# Patient Record
Sex: MALE | Race: WHITE | NOT HISPANIC OR LATINO | Employment: STUDENT | URBAN - METROPOLITAN AREA
[De-identification: names, ages, dates, MRNs, and addresses within clinical notes are randomized per-mention and may not be internally consistent; named-entity substitution may affect disease eponyms.]

---
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
## 2021-11-16 ENCOUNTER — TELEPHONE (OUTPATIENT)
Dept: CARDIOLOGY CLINIC | Facility: CLINIC | Age: 21
End: 2021-11-16

## 2022-10-10 ENCOUNTER — HOSPITAL ENCOUNTER (EMERGENCY)
Facility: HOSPITAL | Age: 22
Discharge: HOME | End: 2022-10-10
Attending: EMERGENCY MEDICINE | Admitting: EMERGENCY MEDICINE
Payer: COMMERCIAL

## 2022-10-10 ENCOUNTER — APPOINTMENT (EMERGENCY)
Dept: CT IMAGING | Facility: HOSPITAL | Age: 22
End: 2022-10-10
Payer: COMMERCIAL

## 2022-10-10 ENCOUNTER — APPOINTMENT (OUTPATIENT)
Dept: RADIOLOGY | Facility: HOSPITAL | Age: 22
End: 2022-10-10
Payer: COMMERCIAL

## 2022-10-10 VITALS
SYSTOLIC BLOOD PRESSURE: 106 MMHG | OXYGEN SATURATION: 96 % | TEMPERATURE: 99 F | HEIGHT: 69 IN | BODY MASS INDEX: 20.73 KG/M2 | RESPIRATION RATE: 16 BRPM | HEART RATE: 87 BPM | DIASTOLIC BLOOD PRESSURE: 53 MMHG | WEIGHT: 140 LBS

## 2022-10-10 DIAGNOSIS — R11.2 NAUSEA AND VOMITING: ICD-10-CM

## 2022-10-10 DIAGNOSIS — R10.9 ABDOMINAL PAIN: ICD-10-CM

## 2022-10-10 DIAGNOSIS — B34.9 ACUTE VIRAL SYNDROME: Primary | ICD-10-CM

## 2022-10-10 LAB
ALBUMIN SERPL BCP-MCNC: 3.9 G/DL (ref 3.5–5)
ALP SERPL-CCNC: 77 U/L (ref 46–116)
ALT SERPL W P-5'-P-CCNC: 10 U/L (ref 12–78)
ANION GAP SERPL CALCULATED.3IONS-SCNC: 10 MMOL/L (ref 4–13)
APTT PPP: 30 SECONDS (ref 23–37)
AST SERPL W P-5'-P-CCNC: 13 U/L (ref 5–45)
ATRIAL RATE: 90 BPM
BASOPHILS # BLD AUTO: 0.03 THOUSANDS/ΜL (ref 0–0.1)
BASOPHILS NFR BLD AUTO: 0 % (ref 0–1)
BILIRUB SERPL-MCNC: 0.5 MG/DL (ref 0.2–1)
BUN SERPL-MCNC: 11 MG/DL (ref 5–25)
CALCIUM SERPL-MCNC: 9 MG/DL (ref 8.3–10.1)
CARDIAC TROPONIN I PNL SERPL HS: 6 NG/L
CHLORIDE SERPL-SCNC: 103 MMOL/L (ref 96–108)
CK SERPL-CCNC: 71 U/L (ref 39–308)
CO2 SERPL-SCNC: 26 MMOL/L (ref 21–32)
CREAT SERPL-MCNC: 1.25 MG/DL (ref 0.6–1.3)
CRP SERPL QL: 93.1 MG/L
EOSINOPHIL # BLD AUTO: 0.09 THOUSAND/ΜL (ref 0–0.61)
EOSINOPHIL NFR BLD AUTO: 1 % (ref 0–6)
ERYTHROCYTE [DISTWIDTH] IN BLOOD BY AUTOMATED COUNT: 15 % (ref 11.6–15.1)
ERYTHROCYTE [SEDIMENTATION RATE] IN BLOOD: 48 MM/HOUR (ref 0–14)
FLUAV RNA RESP QL NAA+PROBE: NEGATIVE
FLUBV RNA RESP QL NAA+PROBE: NEGATIVE
GFR SERPL CREATININE-BSD FRML MDRD: 81 ML/MIN/1.73SQ M
GLUCOSE SERPL-MCNC: 108 MG/DL (ref 65–140)
HCT VFR BLD AUTO: 43.3 % (ref 36.5–49.3)
HGB BLD-MCNC: 13.5 G/DL (ref 12–17)
IMM GRANULOCYTES # BLD AUTO: 0.03 THOUSAND/UL (ref 0–0.2)
IMM GRANULOCYTES NFR BLD AUTO: 0 % (ref 0–2)
INR PPP: 1.04 (ref 0.84–1.19)
LACTATE SERPL-SCNC: 0.9 MMOL/L (ref 0.5–2)
LIPASE SERPL-CCNC: 94 U/L (ref 73–393)
LYMPHOCYTES # BLD AUTO: 1 THOUSANDS/ΜL (ref 0.6–4.47)
LYMPHOCYTES NFR BLD AUTO: 11 % (ref 14–44)
MCH RBC QN AUTO: 23 PG (ref 26.8–34.3)
MCHC RBC AUTO-ENTMCNC: 31.2 G/DL (ref 31.4–37.4)
MCV RBC AUTO: 74 FL (ref 82–98)
MONOCYTES # BLD AUTO: 1.09 THOUSAND/ΜL (ref 0.17–1.22)
MONOCYTES NFR BLD AUTO: 12 % (ref 4–12)
NEUTROPHILS # BLD AUTO: 6.98 THOUSANDS/ΜL (ref 1.85–7.62)
NEUTS SEG NFR BLD AUTO: 76 % (ref 43–75)
NRBC BLD AUTO-RTO: 0 /100 WBCS
P AXIS: 65 DEGREES
PLATELET # BLD AUTO: 238 THOUSANDS/UL (ref 149–390)
PMV BLD AUTO: 10.6 FL (ref 8.9–12.7)
POTASSIUM SERPL-SCNC: 3.7 MMOL/L (ref 3.5–5.3)
PR INTERVAL: 126 MS
PROCALCITONIN SERPL-MCNC: 0.14 NG/ML
PROT SERPL-MCNC: 8.1 G/DL (ref 6.4–8.4)
PROTHROMBIN TIME: 14.4 SECONDS (ref 11.6–14.5)
QRS AXIS: 82 DEGREES
QRSD INTERVAL: 82 MS
QT INTERVAL: 328 MS
QTC INTERVAL: 401 MS
RBC # BLD AUTO: 5.87 MILLION/UL (ref 3.88–5.62)
RSV RNA RESP QL NAA+PROBE: NEGATIVE
SARS-COV-2 RNA RESP QL NAA+PROBE: NEGATIVE
SODIUM SERPL-SCNC: 139 MMOL/L (ref 135–147)
T WAVE AXIS: 45 DEGREES
VENTRICULAR RATE: 90 BPM
WBC # BLD AUTO: 9.22 THOUSAND/UL (ref 4.31–10.16)

## 2022-10-10 PROCEDURE — 83605 ASSAY OF LACTIC ACID: CPT | Performed by: EMERGENCY MEDICINE

## 2022-10-10 PROCEDURE — 84145 PROCALCITONIN (PCT): CPT | Performed by: EMERGENCY MEDICINE

## 2022-10-10 PROCEDURE — 93005 ELECTROCARDIOGRAM TRACING: CPT

## 2022-10-10 PROCEDURE — 80053 COMPREHEN METABOLIC PANEL: CPT | Performed by: EMERGENCY MEDICINE

## 2022-10-10 PROCEDURE — C9113 INJ PANTOPRAZOLE SODIUM, VIA: HCPCS | Performed by: EMERGENCY MEDICINE

## 2022-10-10 PROCEDURE — 86140 C-REACTIVE PROTEIN: CPT | Performed by: EMERGENCY MEDICINE

## 2022-10-10 PROCEDURE — 96374 THER/PROPH/DIAG INJ IV PUSH: CPT

## 2022-10-10 PROCEDURE — 99285 EMERGENCY DEPT VISIT HI MDM: CPT | Performed by: EMERGENCY MEDICINE

## 2022-10-10 PROCEDURE — 85652 RBC SED RATE AUTOMATED: CPT | Performed by: EMERGENCY MEDICINE

## 2022-10-10 PROCEDURE — 82550 ASSAY OF CK (CPK): CPT | Performed by: EMERGENCY MEDICINE

## 2022-10-10 PROCEDURE — 71045 X-RAY EXAM CHEST 1 VIEW: CPT

## 2022-10-10 PROCEDURE — 96361 HYDRATE IV INFUSION ADD-ON: CPT

## 2022-10-10 PROCEDURE — 74177 CT ABD & PELVIS W/CONTRAST: CPT

## 2022-10-10 PROCEDURE — 93010 ELECTROCARDIOGRAM REPORT: CPT | Performed by: INTERNAL MEDICINE

## 2022-10-10 PROCEDURE — G1004 CDSM NDSC: HCPCS

## 2022-10-10 PROCEDURE — 36415 COLL VENOUS BLD VENIPUNCTURE: CPT | Performed by: EMERGENCY MEDICINE

## 2022-10-10 PROCEDURE — 99285 EMERGENCY DEPT VISIT HI MDM: CPT

## 2022-10-10 PROCEDURE — 0241U HB NFCT DS VIR RESP RNA 4 TRGT: CPT | Performed by: EMERGENCY MEDICINE

## 2022-10-10 PROCEDURE — 85730 THROMBOPLASTIN TIME PARTIAL: CPT | Performed by: EMERGENCY MEDICINE

## 2022-10-10 PROCEDURE — 83690 ASSAY OF LIPASE: CPT | Performed by: EMERGENCY MEDICINE

## 2022-10-10 PROCEDURE — 87040 BLOOD CULTURE FOR BACTERIA: CPT | Performed by: EMERGENCY MEDICINE

## 2022-10-10 PROCEDURE — 85610 PROTHROMBIN TIME: CPT | Performed by: EMERGENCY MEDICINE

## 2022-10-10 PROCEDURE — 96375 TX/PRO/DX INJ NEW DRUG ADDON: CPT

## 2022-10-10 PROCEDURE — 84484 ASSAY OF TROPONIN QUANT: CPT | Performed by: EMERGENCY MEDICINE

## 2022-10-10 PROCEDURE — 85025 COMPLETE CBC W/AUTO DIFF WBC: CPT | Performed by: EMERGENCY MEDICINE

## 2022-10-10 RX ORDER — ONDANSETRON 2 MG/ML
4 INJECTION INTRAMUSCULAR; INTRAVENOUS ONCE
Status: COMPLETED | OUTPATIENT
Start: 2022-10-10 | End: 2022-10-10

## 2022-10-10 RX ORDER — DIPHENHYDRAMINE HYDROCHLORIDE 50 MG/ML
25 INJECTION INTRAMUSCULAR; INTRAVENOUS ONCE
Status: COMPLETED | OUTPATIENT
Start: 2022-10-10 | End: 2022-10-10

## 2022-10-10 RX ORDER — PANTOPRAZOLE SODIUM 40 MG/10ML
40 INJECTION, POWDER, LYOPHILIZED, FOR SOLUTION INTRAVENOUS ONCE
Status: COMPLETED | OUTPATIENT
Start: 2022-10-10 | End: 2022-10-10

## 2022-10-10 RX ORDER — KETOROLAC TROMETHAMINE 30 MG/ML
15 INJECTION, SOLUTION INTRAMUSCULAR; INTRAVENOUS ONCE
Status: COMPLETED | OUTPATIENT
Start: 2022-10-10 | End: 2022-10-10

## 2022-10-10 RX ORDER — ACETAMINOPHEN 325 MG/1
975 TABLET ORAL ONCE
Status: COMPLETED | OUTPATIENT
Start: 2022-10-10 | End: 2022-10-10

## 2022-10-10 RX ORDER — ONDANSETRON 4 MG/1
4 TABLET, ORALLY DISINTEGRATING ORAL EVERY 6 HOURS PRN
Qty: 20 TABLET | Refills: 0 | Status: SHIPPED | OUTPATIENT
Start: 2022-10-10

## 2022-10-10 RX ADMIN — PANTOPRAZOLE SODIUM 40 MG: 40 INJECTION, POWDER, FOR SOLUTION INTRAVENOUS at 05:18

## 2022-10-10 RX ADMIN — SODIUM CHLORIDE 1000 ML: 0.9 INJECTION, SOLUTION INTRAVENOUS at 05:19

## 2022-10-10 RX ADMIN — IOHEXOL 100 ML: 350 INJECTION, SOLUTION INTRAVENOUS at 06:28

## 2022-10-10 RX ADMIN — ONDANSETRON 4 MG: 2 INJECTION INTRAMUSCULAR; INTRAVENOUS at 05:18

## 2022-10-10 RX ADMIN — KETOROLAC TROMETHAMINE 15 MG: 30 INJECTION, SOLUTION INTRAMUSCULAR; INTRAVENOUS at 05:18

## 2022-10-10 RX ADMIN — ACETAMINOPHEN 975 MG: 325 TABLET ORAL at 05:16

## 2022-10-10 RX ADMIN — DIPHENHYDRAMINE HYDROCHLORIDE 25 MG: 50 INJECTION INTRAMUSCULAR; INTRAVENOUS at 06:03

## 2022-10-10 RX ADMIN — SODIUM CHLORIDE 1000 ML: 0.9 INJECTION, SOLUTION INTRAVENOUS at 06:44

## 2022-10-10 NOTE — ED PROVIDER NOTES
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 presents for evaluation of fever, reportedly 106 3 at home via temporal scanner thermometer, 3 days of chills, nausea vomiting, diffuse pulsating abdominal pain and intermittent chest pain which he describes as squeezing whenever get he gets the chills  No current chest pain, chest pain is not exertional or positional  Otherwise no diarrhea, no headaches, no vision changes, no shortness of breath  He has been taking Tylenol with relief of fever however it would always come back  Of note patient has been on 4 days of antibiotics for dental infection, possibly clindamycin though it made him sick so he stopped taking them  Currently denies any dental pain  Patient does have some oral ulcerations which he states that he gets repeatedly whenever he is stressed or sick          Prior to Admission Medications   Prescriptions Last Dose Informant Patient Reported?  Taking?   colchicine (COLCRYS) 0 6 mg tablet  Mother No No   Sig: Take 1 tablet (0 6 mg total) by mouth daily for 27 days   Patient not taking: Reported on 11/23/2020   ibuprofen (MOTRIN) 400 mg tablet  Self No No   Sig: Take 1 tablet (400 mg total) by mouth every 8 (eight) hours for 35 doses   loratadine (CLARITIN) 10 mg tablet  Self Yes No   Sig: Take 10 mg by mouth daily   ondansetron (ZOFRAN) 4 mg tablet  Self No No   Sig: Take 1 tablet (4 mg total) by mouth every 8 (eight) hours as needed for nausea or vomiting   Patient not taking: Reported on 10/6/2020      Facility-Administered Medications: None       Past Medical History:   Diagnosis Date   • Asthma    • Autoimmune disorder ([facility] )    • Mononucleosis    • Myocarditis ([facility] )    • Splenomegaly    • Vascular abnormality        Past Surgical History:   Procedure Laterality Date   • EGD AND COLONOSCOPY  10/25/2018   • WISDOM TOOTH EXTRACTION         Family History   Adopted: Yes   Problem Relation Age of Onset   • Colon polyps Neg Hx    • Colon cancer Neg Hx      I have reviewed and agree with the history as documented  E-Cigarette/Vaping   • E-Cigarette Use Never User      E-Cigarette/Vaping Substances   • Nicotine No    • THC No    • CBD No    • Flavoring No    • Other No    • Unknown No      Social History     Tobacco Use   • Smoking status: Never Smoker   • Smokeless tobacco: Never Used   Vaping Use   • Vaping Use: Never used   Substance Use Topics   • Alcohol use: No   • Drug use: No       Review of Systems   Constitutional: Positive for chills and fever  Negative for appetite change  HENT: Negative for rhinorrhea and sore throat  Eyes: Negative for photophobia and visual disturbance  Respiratory: Positive for cough  Negative for shortness of breath  Cardiovascular: Positive for chest pain  Negative for palpitations  Gastrointestinal: Positive for abdominal pain, nausea and vomiting  Negative for diarrhea  Genitourinary: Negative for dysuria, frequency and urgency  Skin: Negative for rash  Neurological: Negative for dizziness and weakness  All other systems reviewed and are negative  Physical Exam  Physical Exam  Vitals and nursing note reviewed  Constitutional:       Appearance: He is well-developed  HENT:      Head: Normocephalic and atraumatic  Right Ear: External ear normal       Left Ear: External ear normal       Mouth/Throat:      Mouth: Mucous membranes are dry  Comments: Ulceration to the upper lip as well as right side of the tongue  Eyes:      Conjunctiva/sclera: Conjunctivae normal       Pupils: Pupils are equal, round, and reactive to light  Neck:      Vascular: No JVD  Trachea: No tracheal deviation  Cardiovascular:      Rate and Rhythm: Normal rate and regular rhythm  Heart sounds: Normal heart sounds  No murmur heard  No systolic murmur is present  No friction rub  No gallop  Pulmonary:      Effort: Pulmonary effort is normal  No respiratory distress  Breath sounds: Normal breath sounds  No stridor  No decreased breath sounds, wheezing or rales  Abdominal:      General: There is no distension  Palpations: Abdomen is soft  There is no mass  Tenderness: There is abdominal tenderness  There is no guarding or rebound  Comments: Tenderness in the right lower quadrant with localized guarding   Musculoskeletal:         General: Normal range of motion  Cervical back: Normal range of motion and neck supple  Skin:     General: Skin is warm and dry  Capillary Refill: Capillary refill takes less than 2 seconds  Coloration: Skin is not pale  Findings: No erythema or rash  Neurological:      General: No focal deficit present  Mental Status: He is alert and oriented to person, place, and time  Cranial Nerves: No cranial nerve deficit           Vital Signs  ED Triage Vitals   Temperature Pulse Respirations Blood Pressure SpO2   10/10/22 0459 10/10/22 0459 10/10/22 0459 10/10/22 0459 10/10/22 0459   99 °F (37 2 °C) 91 18 120/58 96 %      Temp Source Heart Rate Source Patient Position - Orthostatic VS BP Location FiO2 (%)   10/10/22 0459 10/10/22 0459 -- -- --   Oral Monitor         Pain Score       10/10/22 0516       Med Not Given for Pain - for MAR use only           Vitals:    10/10/22 0459 10/10/22 0545 10/10/22 0600 10/10/22 0630   BP: 120/58 114/57 106/53 106/53   Pulse: 91 92 93 87         Visual Acuity      ED Medications  Medications   sodium chloride 0 9 % bolus 1,000 mL (1,000 mL Intravenous New Bag 10/10/22 0644)   sodium chloride 0 9 % bolus 1,000 mL (0 mL Intravenous Stopped 10/10/22 0629)   ondansetron (ZOFRAN) injection 4 mg (4 mg Intravenous Given 10/10/22 0518)   ketorolac (TORADOL) injection 15 mg (15 mg Intravenous Given 10/10/22 0518)   acetaminophen (TYLENOL) tablet 975 mg (975 mg Oral Given 10/10/22 0516)   pantoprazole (PROTONIX) injection 40 mg (40 mg Intravenous Given 10/10/22 0518)   diphenhydrAMINE (BENADRYL) injection 25 mg (25 mg Intravenous Given 10/10/22 0603)   iohexol (OMNIPAQUE) 350 MG/ML injection (MULTI-DOSE) 100 mL (100 mL Intravenous Given 10/10/22 9997)       Diagnostic Studies  Results Reviewed     Procedure Component Value Units Date/Time    FLU/RSV/COVID - if FLU/RSV clinically relevant [[id number]]  (Normal) Collected: 10/10/22 0512    Lab Status: Final result Specimen: Nares from Nose Updated: 10/10/22 0609     SARS-CoV-2 Negative     INFLUENZA A PCR Negative     INFLUENZA B PCR Negative     RSV PCR Negative    Narrative:      FOR PEDIATRIC PATIENTS - copy/paste COVID Guidelines URL to browser: [email or web address]    SARS-CoV-2 assay is a Nucleic Acid Amplification assay intended for the  qualitative detection of nucleic acid from SARS-CoV-2 in nasopharyngeal  swabs  Results are for the presumptive identification of SARS-CoV-2 RNA  Positive results are indicative of infection with SARS-CoV-2, the virus  causing COVID-19, but do not rule out bacterial infection or co-infection  with other viruses  Laboratories within the [place] and its  territories are required to report all positive results to the appropriate  public health authorities  Negative results do not preclude SARS-CoV-2  infection and should not be used as the sole basis for treatment or other  patient management decisions   Negative results must be combined with  clinical observations, patient history, and epidemiological information  This test has not been FDA cleared or approved  This test has been authorized by FDA under an Emergency Use Authorization  (EUA)  This test is only authorized for the duration of time the  declaration that circumstances exist justifying the authorization of the  emergency use of an in vitro diagnostic tests for detection of SARS-CoV-2  virus and/or diagnosis of COVID-19 infection under section 564(b)(1) of  the Act, 21 U  S C  918NCO-6(R)(0), unless the authorization is terminated  or revoked sooner  The test has been validated but independent review by FDA  and CLIA is pending  Test performed using [employer] GeneXpert: This RT-PCR assay targets N2,  a region unique to SARS-CoV-2  A conserved region in the E-gene was chosen  for pan-Sarbecovirus detection which includes SARS-CoV-2  According to CMS-2020-01-R, this platform meets the definition of high-throughput technology      Lipase [[id number]]  (Normal) Collected: 10/10/22 0512    Lab Status: Final result Specimen: Blood from Arm, Left Updated: 10/10/22 0601     Lipase 94 u/L     Procalcitonin [[id number]]  (Normal) Collected: 10/10/22 0512    Lab Status: Final result Specimen: Blood from Arm, Left Updated: 10/10/22 0559     Procalcitonin 0 14 ng/ml     HS Troponin 0hr (reflex protocol) [[id number]]  (Normal) Collected: 10/10/22 0512    Lab Status: Final result Specimen: Blood from Arm, Left Updated: 10/10/22 0557     hs TnI 0hr 6 ng/L     Sedimentation rate, automated [[id number]]  (Abnormal) Collected: 10/10/22 0512    Lab Status: Final result Specimen: Blood from Arm, Left Updated: 10/10/22 0553     Sed Rate 48 mm/hour     Comprehensive metabolic panel [[id number]]  (Abnormal) Collected: 10/10/22 0512    Lab Status: Final result Specimen: Blood from Arm, Left Updated: 10/10/22 0552     Sodium 139 mmol/L      Potassium 3 7 mmol/L      Chloride 103 mmol/L      CO2 26 mmol/L ANION GAP 10 mmol/L      BUN 11 mg/dL      Creatinine 1 25 mg/dL      Glucose 108 mg/dL      Calcium 9 0 mg/dL      AST 13 U/L      ALT 10 U/L      Alkaline Phosphatase 77 U/L      Total Protein 8 1 g/dL      Albumin 3 9 g/dL      Total Bilirubin 0 50 mg/dL      eGFR 81 ml/min/1 73sq m     Narrative:      [place] guidelines for Chronic Kidney Disease (CKD):   •  Stage 1 with normal or high GFR (GFR > 90 mL/min/1 73 square meters)  •  Stage 2 Mild CKD (GFR = 60-89 mL/min/1 73 square meters)  •  Stage 3A Moderate CKD (GFR = 45-59 mL/min/1 73 square meters)  •  Stage 3B Moderate CKD (GFR = 30-44 mL/min/1 73 square meters)  •  Stage 4 Severe CKD (GFR = 15-29 mL/min/1 73 square meters)  •  Stage 5 End Stage CKD (GFR <15 mL/min/1 73 square meters)  Note: GFR calculation is accurate only with a steady state creatinine    CK (with reflex to MB) [[id number]]  (Normal) Collected: 10/10/22 0512    Lab Status: Final result Specimen: Blood from Arm, Left Updated: 10/10/22 0552     Total CK 71 U/L     C-reactive protein [[id number]]  (Abnormal) Collected: 10/10/22 0512    Lab Status: Final result Specimen: Blood from Arm, Left Updated: 10/10/22 0552     CRP 93 1 mg/L     Lactic acid, plasma [[id number]]  (Normal) Collected: 10/10/22 0512    Lab Status: Final result Specimen: Blood from Arm, Left Updated: 10/10/22 0551     LACTIC ACID 0 9 mmol/L     Narrative:      Result may be elevated if tourniquet was used during collection      Wilma Pack [[id number]]  (Normal) Collected: 10/10/22 0518    Lab Status: Final result Specimen: Blood from Arm, Right Updated: 10/10/22 0547     Protime 14 4 seconds      INR 1 04    APTT [[id number]]  (Normal) Collected: 10/10/22 0518    Lab Status: Final result Specimen: Blood from Arm, Right Updated: 10/10/22 0547     PTT 30 seconds     CBC and differential [[id number]]  (Abnormal) Collected: 10/10/22 0512    Lab Status: Final result Specimen: Blood from Arm, Left Updated: 10/10/22 0533     WBC 9 22 Thousand/uL      RBC 5 87 Million/uL      Hemoglobin 13 5 g/dL      Hematocrit 43 3 %      MCV 74 fL      MCH 23 0 pg      MCHC 31 2 g/dL      RDW 15 0 %      MPV 10 6 fL      Platelets 024 Thousands/uL      nRBC 0 /100 WBCs      Neutrophils Relative 76 %      Immat GRANS % 0 %      Lymphocytes Relative 11 %      Monocytes Relative 12 %      Eosinophils Relative 1 %      Basophils Relative 0 %      Neutrophils Absolute 6 98 Thousands/µL      Immature Grans Absolute 0 03 Thousand/uL      Lymphocytes Absolute 1 00 Thousands/µL      Monocytes Absolute 1 09 Thousand/µL      Eosinophils Absolute 0 09 Thousand/µL      Basophils Absolute 0 03 Thousands/µL     Blood culture [[id number]] Collected: 10/10/22 0512    Lab Status: In process Specimen: Blood from Arm, Right Updated: 10/10/22 0529    Blood culture [[id number]] Collected: 10/10/22 0512    Lab Status: In process Specimen: Blood from Arm, Left Updated: 10/10/22 0529    UA w Reflex to Microscopic w Reflex to Culture [[id number]]     Lab Status: No result Specimen: Urine                  CT abdomen pelvis with contrast   Final Result by Nisreen Frazier MD (10/10 2746)      1  No acute abnormality   2  Mild splenomegaly            Workstation performed: [id number]         XR chest portable   ED Interpretation by Duarte Gamez DO (10/10 3324)   This study was ordered and independently reviewed by me    No acute findings noted                    Procedures  Procedures         ED Course  ED Course as of 10/10/22 0656   Mon Oct 10, 2022   1531 Lung sounds clear with no audible murmur on cardiac examination  6712 Procedure Note: EKG  Date/Time: 10/10/22 5:19 AM   Performed by: Parish Tan  Authorized by: Parish Tan  Indications / Diagnosis: CP  ECG reviewed by me, the ED Provider: yes   The EKG demonstrates:  Rhythm: normal sinus  Intervals: normal intervals  Axis: normal axis  QRS/Blocks: normal QRS  ST Changes:No acute ST Changes, no STD/HUBERT  6119 Resting comfortably, no acute complaints                        PERC Rule for PE    Flowsheet Row Most Recent Value   PERC Rule for PE    Age >=50 0 Filed at: 10/10/2022 0530   HR >=100 0 Filed at: 10/10/2022 0530   O2 Sat on room air < 95% 0 Filed at: 10/10/2022 0530   History of PE or DVT 0 Filed at: 10/10/2022 0530   Recent trauma or surgery 0 Filed at: 10/10/2022 0530   Hemoptysis 0 Filed at: 10/10/2022 0530   Exogenous estrogen 0 Filed at: 10/10/2022 0530   Unilateral leg swelling 0 Filed at: 10/10/2022 0530   PERC Rule for PE Results 0 Filed at: 10/10/2022 0530              SBIRT 20yo+    Flowsheet Row Most Recent Value   SBIRT (25 yo +)    In order to provide better care to our patients, we are screening all of our patients for alcohol and drug use  Would it be okay to ask you these screening questions? Yes Filed at: 10/10/2022 2933   Initial Alcohol Screen: US AUDIT-C     1  How often do you have a drink containing alcohol? 1 Filed at: 10/10/2022 0542   2  How many drinks containing alcohol do you have on a typical day you are drinking? 0 Filed at: 10/10/2022 0542   3a  Male UNDER 65: How often do you have five or more drinks on one occasion? 0 Filed at: 10/10/2022 0542   3b  FEMALE Any Age, or MALE 65+: How often do you have 4 or more drinks on one occassion? 0 Filed at: 10/10/2022 0542   Audit-C Score 1 Filed at: 10/10/2022 3578   MONA: How many times in the past year have you    Used an illegal drug or used a prescription medication for non-medical reasons?  Never Filed at: 10/10/2022 0542                    MDM  Number of Diagnoses or Management Options  Abdominal pain: new and requires workup  Acute viral syndrome: new and requires workup  Nausea and vomiting: new and requires workup  Diagnosis management comments: 80-year-old male with fever, multiple complaints including nausea vomiting, abdominal and chest pain does have significant history, will obtain lab work, imaging will re-evaluate after symptomatic treatment       Amount and/or Complexity of Data Reviewed  Clinical lab tests: ordered and reviewed  Tests in the radiology section of CPT®: ordered and reviewed  Tests in the medicine section of CPT®: ordered and reviewed  Independent visualization of images, tracings, or specimens: yes        Disposition  Final diagnoses:   Acute viral syndrome   Abdominal pain   Nausea and vomiting     Time reflects when diagnosis was documented in both MDM as applicable and the Disposition within this note     Time User Action Codes Description Comment    10/10/2022  6:53 AM Charlie Rater Add [B34 9] Acute viral syndrome     10/10/2022  6:53 AM Charlie Rater Add [R10 9] Abdominal pain     10/10/2022  6:54 AM Charlie Rater Add [R11 2] Nausea and vomiting       ED Disposition     ED Disposition   Discharge    Condition   Stable    Date/Time   Mon Oct 10, 2022  6:55 AM    Comment   Stacy Calles discharge to home/self care  Follow-up Information     Follow up With Specialties Details Why Contact Info Additional [address] Nurse Practitioner Schedule an appointment as soon as possible for a visit   [address] 142 [facility] Emergency Department Emergency Medicine  If symptoms worsen [address] [address]  [address] [facility] Emergency Department, [address] [place] [address] 10          Patient's Medications   Discharge Prescriptions    ONDANSETRON (ZOFRAN ODT) 4 MG DISINTEGRATING TABLET    Take 1 tablet (4 mg total) by mouth every 6 (six) hours as needed for nausea or vomiting       Start Date: 10/10/2022End Date: --       Order Dose: 4 mg       Quantity: 20 tablet    Refills: 0       No discharge procedures on file      PDMP Review       Value Time User    PDMP Reviewed  Yes 10/2/2020  1:10 PM Renate Turcios MD          ED Provider  Electronically Signed by           Agusto Kumar,   10/10/22 4467

## 2022-10-15 LAB
BACTERIA BLD CULT: NORMAL
BACTERIA BLD CULT: NORMAL

## 2023-09-06 ENCOUNTER — OFFICE VISIT (OUTPATIENT)
Dept: URGENT CARE | Facility: CLINIC | Age: 23
End: 2023-09-06
Payer: COMMERCIAL

## 2023-09-06 VITALS
DIASTOLIC BLOOD PRESSURE: 60 MMHG | TEMPERATURE: 98.6 F | RESPIRATION RATE: 18 BRPM | SYSTOLIC BLOOD PRESSURE: 122 MMHG | OXYGEN SATURATION: 98 % | HEART RATE: 90 BPM

## 2023-09-06 DIAGNOSIS — L30.9 DERMATITIS: Primary | ICD-10-CM

## 2023-09-06 PROCEDURE — G0382 LEV 3 HOSP TYPE B ED VISIT: HCPCS | Performed by: FAMILY MEDICINE

## 2023-09-06 RX ORDER — PREDNISONE 20 MG/1
40 TABLET ORAL DAILY
Qty: 10 TABLET | Refills: 0 | Status: SHIPPED | OUTPATIENT
Start: 2023-09-06 | End: 2023-09-11

## 2023-09-06 NOTE — PROGRESS NOTES
[facility]        NAME: Jerilyn Farrell is a 26 y.o. male  : 2000    MRN: [id number]  DATE: 2023  TIME: 7:12 PM    Assessment and Plan   Dermatitis [L30.9]  1. Dermatitis  predniSONE 20 mg tablet            Patient Instructions       Follow up with PCP in 3-5 days. Proceed to  ER if symptoms worsen. Chief Complaint     Chief Complaint   Patient presents with   • Skin Problem     Patient states he just got home from work today and noticed a rash all over his upper torso. 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 History of Present Illness       27-year-old male with onset of rash on his back, chest, shoulders and belly beginning this morning. Rash is red, inflamed and itchy. Review of Systems   Review of Systems   Constitutional: Negative. HENT: Negative. Eyes: Negative. Respiratory: Negative. Cardiovascular: Negative. Gastrointestinal: Negative. Genitourinary: Negative. Skin: Positive for rash. Allergic/Immunologic: Negative. Neurological: Negative. Hematological: Negative. Psychiatric/Behavioral: Negative.           Current Medications       Current Outpatient Medications:   •  predniSONE 20 mg tablet, Take 2 tablets (40 mg total) by mouth daily for 5 days, Disp: 10 tablet, Rfl: 0  •  colchicine (COLCRYS) 0.6 mg tablet, Take 1 tablet (0.6 mg total) by mouth daily for 27 days (Patient not taking: Reported on 2020), Disp: 27 tablet, Rfl: 0  •  ibuprofen (MOTRIN) 400 mg tablet, Take 1 tablet (400 mg total) by mouth every 8 (eight) hours for 35 doses, Disp: 35 tablet, Rfl: 0  •  loratadine (CLARITIN) 10 mg tablet, Take 10 mg by mouth daily, Disp: , Rfl:   •  ondansetron (Zofran ODT) 4 mg disintegrating tablet, Take 1 tablet (4 mg total) by mouth every 6 (six) hours as needed for nausea or vomiting, Disp: 20 tablet, Rfl: 0  •  ondansetron (ZOFRAN) 4 mg tablet, Take 1 tablet (4 mg total) by mouth every 8 (eight) hours as needed for nausea or vomiting (Patient not taking: Reported on 10/6/2020), Disp: 20 tablet, Rfl: 0    Current Allergies     Allergies as of 09/06/2023 - Reviewed 09/06/2023   Allergen Reaction Noted   • Bee venom Hives 01/25/2016            The following portions of the patient's history were reviewed and updated as appropriate: allergies, current medications, past family history, past medical history, past social history, past surgical history and problem list.     Past Medical History:   Diagnosis Date   • Asthma    • Autoimmune disorder ([address])    • Mononucleosis    • Myocarditis ([address])    • Splenomegaly    • Vascular abnormality        Past Surgical History:   Procedure Laterality Date   • EGD AND COLONOSCOPY  10/25/2018   • WISDOM TOOTH EXTRACTION         Family History   Adopted: Yes   Problem Relation Age of Onset   • Colon polyps Neg Hx    • Colon cancer Neg Hx          Medications have been verified. Objective   /60   Pulse 90   Temp 98.6 °F (37 °C) (Tympanic)   Resp 18   SpO2 98%   No LMP for male patient. Physical Exam     Physical Exam  Constitutional:       Appearance: He is well-developed. HENT:      Head: Normocephalic. Eyes:      Pupils: Pupils are equal, round, and reactive to light. Cardiovascular:      Rate and Rhythm: Normal rate. Pulmonary:      Effort: Pulmonary effort is normal.   Musculoskeletal:         General: Normal range of motion. Cervical back: Normal range of motion. Skin:     General: Skin is warm. Findings: Erythema present. Neurological:      Mental Status: He is alert and oriented to person, place, and time.

## 2023-10-01 ENCOUNTER — OFFICE VISIT (OUTPATIENT)
Dept: URGENT CARE | Facility: CLINIC | Age: 23
End: 2023-10-01
Payer: COMMERCIAL

## 2023-10-01 VITALS
SYSTOLIC BLOOD PRESSURE: 126 MMHG | RESPIRATION RATE: 18 BRPM | OXYGEN SATURATION: 98 % | DIASTOLIC BLOOD PRESSURE: 60 MMHG | HEART RATE: 111 BPM | TEMPERATURE: 97.3 F

## 2023-10-01 DIAGNOSIS — L02.213 ABSCESS OF CHEST WALL: Primary | ICD-10-CM

## 2023-10-01 PROCEDURE — 10060 I&D ABSCESS SIMPLE/SINGLE: CPT

## 2023-10-01 PROCEDURE — G0382 LEV 3 HOSP TYPE B ED VISIT: HCPCS

## 2023-10-01 RX ORDER — SULFAMETHOXAZOLE AND TRIMETHOPRIM 800; 160 MG/1; MG/1
1 TABLET ORAL EVERY 12 HOURS SCHEDULED
Qty: 14 TABLET | Refills: 0 | Status: SHIPPED | OUTPATIENT
Start: 2023-10-01 | End: 2023-10-08

## 2023-10-01 NOTE — PROGRESS NOTES
[facility]        NAME: Rosalee Salinas is a 26 y.o. male  : 2000    MRN: [id number]  DATE: 2023  TIME: 7:21 PM    Assessment and Plan   Abscess of chest wall [L02.213]  1. Abscess of chest wall  sulfamethoxazole-trimethoprim (BACTRIM DS) 800-160 mg per tablet        - Incision and drainage of chest wall abscess via stab incision with 11 blade     Patient Instructions       Follow up with PCP in 3-5 days. Proceed to  ER if symptoms worsen. Chief Complaint     Chief Complaint   Patient presents with    Skin Irritation on Chest     Pt reports bumps and redness earlier in September which was resolving; however, the skin irritation on chest area worsened a few days after finishing the course of antibiotics. Pt reports pain and a burning sensation with palpation of the area. Pt denies fevers, chills. History of Present Illness       25 y/o M presents for skin irritation x . Pt presented to  at that time and dx with dermatitis. Says it was sun poisoning. Given prednisolone. Admits back rash has resolved. Since then, "chest rash" has worsened. Now pain and purulent discharge. Using "acne" cream without relief. Review of Systems   Review of Systems   Constitutional:  Negative for chills and fever. Skin:  Positive for wound.          Current Medications       Current Outpatient Medications:     sulfamethoxazole-trimethoprim (BACTRIM DS) 800-160 mg per tablet, Take 1 tablet by mouth every 12 (twelve) hours for 7 days, Disp: 14 tablet, Rfl: 0    colchicine (COLCRYS) 0.6 mg tablet, Take 1 tablet (0.6 mg total) by mouth daily for 27 days (Patient not taking: Reported on 2020), Disp: 27 tablet, Rfl: 0    ibuprofen (MOTRIN) 400 mg tablet, Take 1 tablet (400 mg total) by mouth every 8 (eight) hours for 35 doses, Disp: 35 tablet, Rfl: 0    loratadine (CLARITIN) 10 mg tablet, Take 10 mg by mouth daily (Patient not taking: Reported on 10/1/2023), Disp: , Rfl:     ondansetron (Zofran ODT) 4 mg disintegrating tablet, Take 1 tablet (4 mg total) by mouth every 6 (six) hours as needed for nausea or vomiting (Patient not taking: Reported on 10/1/2023), Disp: 20 tablet, Rfl: 0    ondansetron (ZOFRAN) 4 mg tablet, Take 1 tablet (4 mg total) by mouth every 8 (eight) hours as needed for nausea or vomiting (Patient not taking: Reported on 10/6/2020), Disp: 20 tablet, Rfl: 0    Current Allergies     Allergies as of 10/01/2023 - Reviewed 10/01/2023   Allergen Reaction Noted    Bee venom Hives 01/25/2016            The following portions of the patient's history were reviewed and updated as appropriate: allergies, current medications, past family history, past medical history, past social history, past surgical history and problem list.     Past Medical History:   Diagnosis Date    Asthma     Autoimmune disorder ([address])     Mononucleosis     Myocarditis ([address])     Splenomegaly     Vascular abnormality        Past Surgical History:   Procedure Laterality Date    EGD AND COLONOSCOPY  10/25/2018    WISDOM TOOTH EXTRACTION         Family History   Adopted: Yes   Problem Relation Age of Onset    Colon polyps Neg Hx     Colon cancer Neg Hx          Medications have been verified. Objective   /60   Pulse (!) 111   Temp (!) 97.3 °F (36.3 °C) (Tympanic)   Resp 18   SpO2 98%   No LMP for male patient. Physical Exam     Physical Exam  Vitals and nursing note reviewed. Constitutional:       General: He is not in acute distress. Appearance: He is not toxic-appearing. HENT:      Head: Normocephalic and atraumatic. Eyes:      Conjunctiva/sclera: Conjunctivae normal.   Pulmonary:      Effort: Pulmonary effort is normal.   Skin:     Findings: Erythema present. Comments: Abscess on chest wall with surrounding erythema  TTP   Neurological:      Mental Status: He is alert.    Psychiatric:         Mood and Affect: Mood normal.         Behavior: Behavior normal.     Incision and drain    Date/Time: 10/1/2023 6:45 PM    Performed by: Sylvie Bowen PA-C  Authorized by: Sylvie Bowen PA-C  Universal Protocol:  Consent: Verbal consent obtained. Risks and benefits: risks, benefits and alternatives were discussed  Consent given by: patient  Time out: Immediately prior to procedure a "time out" was called to verify the correct patient, procedure, equipment, support staff and site/side marked as required. Patient understanding: patient states understanding of the procedure being performed  Patient identity confirmed: verbally with patient    Patient location:  Bedside  Location:     Type:  Abscess    Location:  Trunk    Trunk location:  Chest  Pre-procedure details:     Skin preparation:  Antiseptic wash  Anesthesia (see MAR for exact dosages): Anesthesia method:  None  Procedure details:     Needle aspiration: no      Incision types:  Stab incision    Scalpel blade:  11    Drainage:  Purulent and bloody    Wound treatment:  Wound left open  Post-procedure details:     Patient tolerance of procedure:   Tolerated well, no immediate complications

## 2023-10-12 ENCOUNTER — HOSPITAL ENCOUNTER (EMERGENCY)
Facility: HOSPITAL | Age: 23
Discharge: HOME | End: 2023-10-12
Attending: EMERGENCY MEDICINE
Payer: COMMERCIAL

## 2023-10-12 VITALS
DIASTOLIC BLOOD PRESSURE: 70 MMHG | TEMPERATURE: 98.4 F | RESPIRATION RATE: 18 BRPM | OXYGEN SATURATION: 100 % | HEART RATE: 67 BPM | SYSTOLIC BLOOD PRESSURE: 123 MMHG

## 2023-10-12 DIAGNOSIS — Z51.89 WOUND CHECK, ABSCESS: Primary | ICD-10-CM

## 2023-10-12 PROCEDURE — 87205 SMEAR GRAM STAIN: CPT | Performed by: PHYSICIAN ASSISTANT

## 2023-10-12 PROCEDURE — 87070 CULTURE OTHR SPECIMN AEROBIC: CPT | Performed by: PHYSICIAN ASSISTANT

## 2023-10-12 PROCEDURE — 99283 EMERGENCY DEPT VISIT LOW MDM: CPT

## 2023-10-12 PROCEDURE — 99284 EMERGENCY DEPT VISIT MOD MDM: CPT | Performed by: PHYSICIAN ASSISTANT

## 2023-10-12 RX ORDER — LIDOCAINE HYDROCHLORIDE 10 MG/ML
10 INJECTION, SOLUTION EPIDURAL; INFILTRATION; INTRACAUDAL; PERINEURAL ONCE
Status: COMPLETED | OUTPATIENT
Start: 2023-10-12 | End: 2023-10-12

## 2023-10-12 RX ORDER — DOXYCYCLINE HYCLATE 100 MG/1
100 CAPSULE ORAL EVERY 12 HOURS SCHEDULED
Qty: 20 CAPSULE | Refills: 0 | Status: SHIPPED | OUTPATIENT
Start: 2023-10-12 | End: 2023-10-19

## 2023-10-12 RX ADMIN — LIDOCAINE HYDROCHLORIDE 10 ML: 10 INJECTION, SOLUTION EPIDURAL; INFILTRATION; INTRACAUDAL; PERINEURAL at 21:11

## 2023-10-12 NOTE — Clinical Note
Lisa Rose was seen and treated in our emergency department on 10/12/2023. Diagnosis:     Chante Sims  is off the rest of the shift today, may return to work on return date. He may return on this date: 10/16/2023         If you have any questions or concerns, please don't hesitate to call.       Mohan Riley PA-C    ______________________________           _______________          _______________  Hospital Representative                              Date                                Time

## 2023-10-13 NOTE — ED PROVIDER NOTES
History  Chief Complaint   Patient presents with    Medical Problem     Pt had sun poisoning got an antibiotic for it about two weeks ago, pt reports it hasn't healed yet and that it still oozes, pt thinks it might be infected     HPI     Dio Hare 26 y.o. male with presents to the ED for CHEST WOUND. History provided by patient. (s) state the symptoms started approx 2 weeks ago. Pertinent positive associated symptoms of wound drainage. Pertinent negative symptoms fevers, chills, nausea, vomitting, HA, or CP. Nothing makes it better. One of the wounds has opened and spontaneously dressed. Pressure on the wound makes it worse. The patient has tried prior I&D at urgent care to treat the symptoms. Prior records reviewed. Prior I&D performed and pt placed on bactrim. No prior wound cx performed    DDX: cellulitis / abscess / SQ cyst.    Past Medical History:   Diagnosis Date    Asthma     Autoimmune disorder ([address])     Mononucleosis     Myocarditis ([address])     Splenomegaly     Vascular abnormality         Past Surgical History:   Procedure Laterality Date    EGD AND COLONOSCOPY  10/25/2018    WISDOM TOOTH EXTRACTION             Prior to Admission Medications   Prescriptions Last Dose Informant Patient Reported?  Taking?   colchicine (COLCRYS) 0.6 mg tablet  Mother No No   Sig: Take 1 tablet (0.6 mg total) by mouth daily for 27 days   Patient not taking: Reported on 11/23/2020   ibuprofen (MOTRIN) 400 mg tablet  Self No No   Sig: Take 1 tablet (400 mg total) by mouth every 8 (eight) hours for 35 doses   loratadine (CLARITIN) 10 mg tablet  Self Yes No   Sig: Take 10 mg by mouth daily   Patient not taking: Reported on 10/1/2023   ondansetron (ZOFRAN) 4 mg tablet  Self No No   Sig: Take 1 tablet (4 mg total) by mouth every 8 (eight) hours as needed for nausea or vomiting   Patient not taking: Reported on 10/6/2020   ondansetron (Zofran ODT) 4 mg disintegrating tablet   No No   Sig: Take 1 tablet (4 mg total) by mouth every 6 (six) hours as needed for nausea or vomiting   Patient not taking: Reported on 10/1/2023      Facility-Administered Medications: None       Past Medical History:   Diagnosis Date    Asthma     Autoimmune disorder ([address])     Mononucleosis     Myocarditis ([address])     Splenomegaly     Vascular abnormality        Past Surgical History:   Procedure Laterality Date    EGD AND COLONOSCOPY  10/25/2018    WISDOM TOOTH EXTRACTION         Family History   Adopted: Yes   Problem Relation Age of Onset    Colon polyps Neg Hx     Colon cancer Neg Hx      I have reviewed and agree with the history as documented. E-Cigarette/Vaping    E-Cigarette Use Never User      E-Cigarette/Vaping Substances    Nicotine No     THC No     CBD No     Flavoring No     Other No     Unknown No      Social History     Tobacco Use    Smoking status: Never    Smokeless tobacco: Never   Vaping Use    Vaping Use: Never used   Substance Use Topics    Alcohol use: No    Drug use: No       Review of Systems   Constitutional:  Negative for fever. Skin:  Positive for wound. Negative for rash. All other systems reviewed and are negative. Physical Exam  Physical Exam  Vitals and nursing note reviewed. Constitutional:       General: He is not in acute distress. Appearance: He is well-developed. HENT:      Head: Normocephalic and atraumatic. Eyes:      Conjunctiva/sclera: Conjunctivae normal.   Cardiovascular:      Rate and Rhythm: Normal rate and regular rhythm. Heart sounds: No murmur heard. Pulmonary:      Effort: Pulmonary effort is normal. No respiratory distress. Breath sounds: Normal breath sounds. Chest:          Comments: Small 3 cm abscess on chest w/ above mild erythema to chest w/ L upper area of prior drained abscess. Abdominal:      Palpations: Abdomen is soft. Tenderness: There is no abdominal tenderness. Musculoskeletal:         General: No swelling. Cervical back: Neck supple.    Skin: General: Skin is warm and dry. Capillary Refill: Capillary refill takes less than 2 seconds. Neurological:      Mental Status: He is alert. Psychiatric:         Mood and Affect: Mood normal.         Vital Signs  ED Triage Vitals [10/12/23 1936]   Temperature Pulse Respirations Blood Pressure SpO2   98.4 °F (36.9 °C) 67 18 123/70 100 %      Temp Source Heart Rate Source Patient Position - Orthostatic VS BP Location FiO2 (%)   Oral Monitor -- -- --      Pain Score       --           Vitals:    10/12/23 1936   BP: 123/70   Pulse: 67         Visual Acuity      ED Medications  Medications   lidocaine (PF) (XYLOCAINE-MPF) 1 % injection 10 mL (10 mL Infiltration Given 10/12/23 2111)       Diagnostic Studies  Results Reviewed       Procedure Component Value Units Date/Time    Wound culture and Gram stain [[id number]]  (Abnormal) Collected: 10/12/23 2118    Lab Status: Preliminary result Specimen: Wound from Chest Wall Updated: 10/14/23 1327     Wound Culture Culture too young- will reincubate     Gram Stain Result 2+ Polys      Rare Gram positive cocci in pairs                   No orders to display              Procedures  Incision and drain    Date/Time: 10/12/2023 8:20 PM    Performed by: Neeta Hernandes PA-C  Authorized by: Neeta Hernandes PA-C  Universal Protocol:  Consent: Verbal consent obtained. Consent given by: patient  Patient understanding: patient states understanding of the procedure being performed  Patient consent: the patient's understanding of the procedure matches consent given  Patient identity confirmed: verbally with patient and hospital-assigned identification number    Patient location:  ED  Location:     Type:  Abscess    Size:  3 cm    Location:  Trunk    Trunk location:  Chest  Pre-procedure details:     Skin preparation:  Antiseptic wash and Betadine  Anesthesia (see MAR for exact dosages):      Anesthesia method:  Local infiltration  Procedure details:     Complexity: Simple    Needle aspiration: no      Incision types:  Stab incision    Scalpel blade:  15    Approach:  Open    Incision depth:  Subcutaneous    Wound management:  Probed and deloculated and irrigated with saline    Drainage:  Purulent and bloody    Drainage amount:  Scant    Wound treatment:  Wound left open    Packing materials:  None  Post-procedure details:     Patient tolerance of procedure: Tolerated well, no immediate complications           ED Course               Stable ED course without acute emergent medical contion, worsening presenting condition, or deterioration. SBIRT 22yo+      Flowsheet Row Most Recent Value   Initial Alcohol Screen: US AUDIT-C     1. How often do you have a drink containing alcohol? 0 Filed at: 10/12/2023 1938   2. How many drinks containing alcohol do you have on a typical day you are drinking? 0 Filed at: 10/12/2023 1938   3a. Male UNDER 65: How often do you have five or more drinks on one occasion? 0 Filed at: 10/12/2023 1938   3b. FEMALE Any Age, or MALE 65+: How often do you have 4 or more drinks on one occassion? 0 Filed at: 10/12/2023 1938   Audit-C Score 0 Filed at: 10/12/2023 1938   MONA: How many times in the past year have you. .. Used an illegal drug or used a prescription medication for non-medical reasons? Never Filed at: 10/12/2023 4163                      Medical Decision Making  Straight forward ED course. I&D sent for culture. Rx for Doxy. Plan for /fu in the OP setting. Referred back to PCP for referral in the OP setting if required. Problems Addressed:  Wound check, abscess: self-limited or minor problem    Amount and/or Complexity of Data Reviewed  Labs: ordered. Risk  Prescription drug management.              Disposition  Final diagnoses:   Wound check, abscess     Time reflects when diagnosis was documented in both MDM as applicable and the Disposition within this note       Time User Action Codes Description Comment 10/12/2023  9:07 PM Romel Irizarry Add [Z51.89] Wound check, abscess           ED Disposition       ED Disposition   Discharge    Condition   Stable    Date/Time   Thu Oct 12, 2023 2107    Comment   Danielle Qureshi discharge to home/self care. Follow-up Information       Follow up With Specialties Details Why [address], [address] [facility] Nurse Practitioner Call today Call today for follow up. [address]              Discharge Medication List as of 10/12/2023  9:08 PM        START taking these medications    Details   doxycycline hyclate (VIBRAMYCIN) 100 mg capsule Take 1 capsule (100 mg total) by mouth every 12 (twelve) hours for 7 days, Starting Thu 10/12/2023, Until Thu 10/19/2023, Normal           CONTINUE these medications which have NOT CHANGED    Details   colchicine (COLCRYS) 0.6 mg tablet Take 1 tablet (0.6 mg total) by mouth daily for 27 days, Starting Sat 10/3/2020, Until Fri 10/30/2020, Normal      ibuprofen (MOTRIN) 400 mg tablet Take 1 tablet (400 mg total) by mouth every 8 (eight) hours for 35 doses, Starting Fri 10/2/2020, Until Mon 11/23/2020, Normal      loratadine (CLARITIN) 10 mg tablet Take 10 mg by mouth daily, Historical Med      ondansetron (Zofran ODT) 4 mg disintegrating tablet Take 1 tablet (4 mg total) by mouth every 6 (six) hours as needed for nausea or vomiting, Starting Mon 10/10/2022, Normal      ondansetron (ZOFRAN) 4 mg tablet Take 1 tablet (4 mg total) by mouth every 8 (eight) hours as needed for nausea or vomiting, Starting Mon 7/6/2020, Normal             No discharge procedures on file.     PDMP Review         Value Time User    PDMP Reviewed  Yes 10/2/2020  1:10 PM Terese Collins MD            ED Provider  Electronically Signed by             Candis Sandy PA-C  10/14/23 4115

## 2023-10-15 LAB
BACTERIA WND AEROBE CULT: ABNORMAL
GRAM STN SPEC: ABNORMAL
GRAM STN SPEC: ABNORMAL

## 2024-02-21 PROBLEM — R50.9 FEVER: Status: RESOLVED | Noted: 2020-09-29 | Resolved: 2024-02-21

## 2024-03-07 ENCOUNTER — TELEPHONE (OUTPATIENT)
Age: 24
End: 2024-03-07

## 2024-03-07 NOTE — TELEPHONE ENCOUNTER
Received call from patients mom requesting to cancel 07/11 appt.    Mom states that the patient got in else where sooner.    Mom declined to jayne at the time of this call

## 2024-07-22 ENCOUNTER — OFFICE VISIT (OUTPATIENT)
Dept: URGENT CARE | Facility: CLINIC | Age: 24
End: 2024-07-22
Payer: COMMERCIAL

## 2024-07-22 VITALS
RESPIRATION RATE: 18 BRPM | OXYGEN SATURATION: 99 % | SYSTOLIC BLOOD PRESSURE: 122 MMHG | HEART RATE: 88 BPM | TEMPERATURE: 98.4 F | DIASTOLIC BLOOD PRESSURE: 84 MMHG

## 2024-07-22 DIAGNOSIS — J06.9 VIRAL URI WITH COUGH: Primary | ICD-10-CM

## 2024-07-22 DIAGNOSIS — R05.1 ACUTE COUGH: ICD-10-CM

## 2024-07-22 PROCEDURE — 87811 SARS-COV-2 COVID19 W/OPTIC: CPT

## 2024-07-22 PROCEDURE — 99213 OFFICE O/P EST LOW 20 MIN: CPT

## 2024-07-22 RX ORDER — TRAZODONE HYDROCHLORIDE 50 MG/1
50 TABLET ORAL
COMMUNITY
Start: 2024-07-11

## 2024-07-22 RX ORDER — FLUTICASONE PROPIONATE 50 MCG
1 SPRAY, SUSPENSION (ML) NASAL DAILY
Qty: 11.1 ML | Refills: 0 | Status: SHIPPED | OUTPATIENT
Start: 2024-07-22

## 2024-07-22 RX ORDER — ALBUTEROL SULFATE 90 UG/1
2 AEROSOL, METERED RESPIRATORY (INHALATION) EVERY 6 HOURS PRN
Qty: 8.5 G | Refills: 0 | Status: SHIPPED | OUTPATIENT
Start: 2024-07-22

## 2024-07-22 RX ORDER — ISOTRETINOIN 30 MG/1
30 CAPSULE ORAL 2 TIMES DAILY
COMMUNITY
Start: 2024-07-17

## 2024-07-22 RX ORDER — PREDNISONE 10 MG/1
TABLET ORAL
Qty: 15 TABLET | Refills: 0 | Status: SHIPPED | OUTPATIENT
Start: 2024-07-22 | End: 2024-07-26

## 2024-07-22 RX ORDER — PSEUDOEPHEDRINE HCL 120 MG/1
120 TABLET, FILM COATED, EXTENDED RELEASE ORAL 2 TIMES DAILY
Qty: 14 TABLET | Refills: 0 | Status: SHIPPED | OUTPATIENT
Start: 2024-07-22 | End: 2024-07-29

## 2024-07-22 NOTE — PROGRESS NOTES
[facility]        NAME: Garret Bang is a 24 y.o. male  : 2000    MRN: [id number]  DATE: 2024  TIME: 4:58 PM    Assessment and Plan   Viral URI with cough [J06.9]  1. Viral URI with cough  dextromethorphan-guaifenesin (MUCINEX DM)  MG per 12 hr tablet    predniSONE 10 mg tablet    fluticasone (FLONASE) 50 mcg/act nasal spray    pseudoephedrine (SUDAFED) 120 MG 12 hr tablet    albuterol (ProAir HFA) 90 mcg/act inhaler      2. Acute cough  Poct Covid 19 Rapid Antigen Test          Patient aware of negative rapid Covid test in clinic.      Patient Instructions     Take prednisone as prescribed.    Mucinex - DM and Sudafed. Flonase.    Albuterol inhaler as needed as directed.    Airborne for extra vitamin C and zinc.    Drink plenty of fluids to stay hydrated.    Warm salt water gargles.    Follow-up with PCP in 3-5 days.    Go to the ED for any worsening symptoms.     If tests are performed, our office will contact you with results only if changes need to made to the care plan discussed with you at the visit. You can review your full results on [facility].      Chief Complaint     Chief Complaint   Patient presents with    Cold Like Symptoms     Patient has had a headache, runny nose, nasal congestion, cough that started on Friday night. Also reports minor SOB at rest          History of Present Illness       24-year-old male presenting with cold-like symptoms x 2-3 days.  Patient reports headache, nasal congestion, runny nose, sore throat, and a cough.  Notes occasional SOB after coughing fits.   No known fevers or GI symptoms.  No OTC treatments tried prior to arrival.        Review of Systems   Review of Systems   Constitutional:  Negative for chills and fever.   HENT:  Positive for congestion, rhinorrhea and sore throat. Negative for ear pain, facial swelling and sinus pressure.    Eyes:  Negative for discharge and redness.   Respiratory:  Positive for cough and shortness  of breath. Negative for chest tightness and wheezing.    Cardiovascular:  Negative for chest pain, palpitations and leg swelling.   Gastrointestinal:  Negative for abdominal pain, diarrhea, nausea and vomiting.   Skin:  Negative for pallor and rash.   Neurological:  Positive for headaches. Negative for dizziness and light-headedness.         Current Medications       Current Outpatient Medications:     ISOtretinoin (ACCUTANE) 30 MG capsule, Take 30 mg by mouth 2 (two) times a day, Disp: , Rfl:     traZODone (DESYREL) 50 mg tablet, Take 50 mg by mouth daily at bedtime as needed for sleep, Disp: , Rfl:     albuterol (ProAir HFA) 90 mcg/act inhaler, Inhale 2 puffs every 6 (six) hours as needed for wheezing or shortness of breath, Disp: 8.5 g, Rfl: 0    colchicine (COLCRYS) 0.6 mg tablet, Take 1 tablet (0.6 mg total) by mouth daily for 27 days (Patient not taking: Reported on 11/23/2020), Disp: 27 tablet, Rfl: 0    dextromethorphan-guaifenesin (MUCINEX DM)  MG per 12 hr tablet, Take 1 tablet by mouth every 12 (twelve) hours for 7 days, Disp: 14 tablet, Rfl: 0    fluticasone (FLONASE) 50 mcg/act nasal spray, 1 spray into each nostril daily, Disp: 11.1 mL, Rfl: 0    ibuprofen (MOTRIN) 400 mg tablet, Take 1 tablet (400 mg total) by mouth every 8 (eight) hours for 35 doses, Disp: 35 tablet, Rfl: 0    loratadine (CLARITIN) 10 mg tablet, Take 10 mg by mouth daily (Patient not taking: Reported on 10/1/2023), Disp: , Rfl:     ondansetron (Zofran ODT) 4 mg disintegrating tablet, Take 1 tablet (4 mg total) by mouth every 6 (six) hours as needed for nausea or vomiting (Patient not taking: Reported on 10/1/2023), Disp: 20 tablet, Rfl: 0    ondansetron (ZOFRAN) 4 mg tablet, Take 1 tablet (4 mg total) by mouth every 8 (eight) hours as needed for nausea or vomiting (Patient not taking: Reported on 10/6/2020), Disp: 20 tablet, Rfl: 0    predniSONE 10 mg tablet, Take 5 tablets (50 mg total) by mouth daily for 1 day, THEN 4 tablets  (40 mg total) daily for 1 day, THEN 3 tablets (30 mg total) daily for 1 day, THEN 2 tablets (20 mg total) daily for 1 day, THEN 1 tablet (10 mg total) daily for 1 day., Disp: 15 tablet, Rfl: 0    pseudoephedrine (SUDAFED) 120 MG 12 hr tablet, Take 1 tablet (120 mg total) by mouth 2 (two) times a day for 7 days, Disp: 14 tablet, Rfl: 0    Current Allergies     Allergies as of 07/22/2024 - Reviewed 07/22/2024   Allergen Reaction Noted    Bee venom Hives 01/25/2016            The following portions of the patient's history were reviewed and updated as appropriate: allergies, current medications, past family history, past medical history, past social history, past surgical history and problem list.     Past Medical History:   Diagnosis Date    Asthma     Autoimmune disorder (HCC)     Mononucleosis     Myocarditis (HCC)     Splenomegaly     Vascular abnormality        Past Surgical History:   Procedure Laterality Date    EGD AND COLONOSCOPY  10/25/2018    WISDOM TOOTH EXTRACTION         Family History   Adopted: Yes   Problem Relation Age of Onset    Colon polyps Neg Hx     Colon cancer Neg Hx          Medications have been verified.        Objective   /84   Pulse 88   Temp 98.4 °F (36.9 °C)   Resp 18   SpO2 99%        Physical Exam     Physical Exam  Vitals and nursing note reviewed.   Constitutional:       General: He is not in acute distress.     Appearance: He is not ill-appearing or diaphoretic.   HENT:      Head: Normocephalic and atraumatic.      Right Ear: Tympanic membrane, ear canal and external ear normal.      Left Ear: Tympanic membrane, ear canal and external ear normal.      Nose: Congestion present.      Mouth/Throat:      Mouth: Mucous membranes are moist.      Pharynx: Oropharynx is clear.   Eyes:      Conjunctiva/sclera: Conjunctivae normal.      Pupils: Pupils are equal, round, and reactive to light.   Cardiovascular:      Rate and Rhythm: Normal rate and regular rhythm.      Pulses: Normal  pulses.      Heart sounds: Normal heart sounds.   Pulmonary:      Effort: Pulmonary effort is normal.      Breath sounds: Normal breath sounds.   Musculoskeletal:         General: Normal range of motion.      Cervical back: Normal range of motion and neck supple.   Skin:     General: Skin is warm and dry.      Capillary Refill: Capillary refill takes less than 2 seconds.   Neurological:      Mental Status: He is alert and oriented to person, place, and time.

## 2024-07-22 NOTE — PATIENT INSTRUCTIONS
Take prednisone as prescribed.    Mucinex - DM and Sudafed. Flonase.    Albuterol inhaler as needed as directed.    Airborne for extra vitamin C and zinc.    Drink plenty of fluids to stay hydrated.    Warm salt water gargles.    Follow-up with PCP in 3-5 days.    Go to the ED for any worsening symptoms.

## 2024-07-28 LAB
SARS-COV-2 AG UPPER RESP QL IA: NEGATIVE
VALID CONTROL: NORMAL

## 2025-06-16 ENCOUNTER — HOSPITAL ENCOUNTER (EMERGENCY)
Facility: HOSPITAL | Age: 25
Discharge: HOME | End: 2025-06-16
Attending: EMERGENCY MEDICINE
Payer: COMMERCIAL

## 2025-06-16 VITALS
RESPIRATION RATE: 18 BRPM | BODY MASS INDEX: 21.22 KG/M2 | HEART RATE: 95 BPM | HEIGHT: 68 IN | DIASTOLIC BLOOD PRESSURE: 99 MMHG | SYSTOLIC BLOOD PRESSURE: 143 MMHG | WEIGHT: 140 LBS | OXYGEN SATURATION: 100 % | TEMPERATURE: 98.3 F

## 2025-06-16 DIAGNOSIS — M54.42 ACUTE MIDLINE LOW BACK PAIN WITH BILATERAL SCIATICA: Primary | ICD-10-CM

## 2025-06-16 DIAGNOSIS — M54.41 ACUTE MIDLINE LOW BACK PAIN WITH BILATERAL SCIATICA: Primary | ICD-10-CM

## 2025-06-16 PROCEDURE — 99282 EMERGENCY DEPT VISIT SF MDM: CPT

## 2025-06-16 PROCEDURE — 99284 EMERGENCY DEPT VISIT MOD MDM: CPT | Performed by: EMERGENCY MEDICINE

## 2025-06-16 RX ORDER — PREDNISONE 20 MG/1
60 TABLET ORAL ONCE
Status: COMPLETED | OUTPATIENT
Start: 2025-06-16 | End: 2025-06-16

## 2025-06-16 RX ORDER — METHYLPREDNISOLONE 4 MG/1
TABLET ORAL
Qty: 21 TABLET | Refills: 0 | Status: SHIPPED | OUTPATIENT
Start: 2025-06-16

## 2025-06-16 RX ORDER — LIDOCAINE 50 MG/G
1 PATCH TOPICAL ONCE
Status: DISCONTINUED | OUTPATIENT
Start: 2025-06-16 | End: 2025-06-16 | Stop reason: HOSPADM

## 2025-06-16 RX ADMIN — PREDNISONE 60 MG: 20 TABLET ORAL at 20:25

## 2025-06-16 RX ADMIN — LIDOCAINE 5% 1 PATCH: 700 PATCH TOPICAL at 20:25

## 2025-06-16 NOTE — Clinical Note
Garret Bang was seen and treated in our emergency department on 6/16/2025.                Diagnosis:     Garret  may return to gym class or sports after being cleared by physician.    He may return on this date:          If you have any questions or concerns, please don't hesitate to call.      Jorge Martínez, DO    ______________________________           _______________          _______________  Hospital Representative                              Date                                Time

## 2025-06-16 NOTE — ED PROVIDER NOTES
Time reflects when diagnosis was documented in both MDM as applicable and the Disposition within this note       Time User Action Codes Description Comment    6/16/2025  8:21 PM Jorge Martínez Add [M54.42,  M54.41] Acute midline low back pain with bilateral sciatica           ED Disposition       ED Disposition   Discharge    Condition   Stable    Date/Time   Mon Jun 16, 2025  8:21 PM    Comment   Garret Bang discharge to home/self care.                   Assessment & Plan       Medical Decision Making  24 yo M with subacute LBP and b/l sciatica. History and symptoms of mechanical etiology. No red flags of cord or systemic pathology. Seen by PCP twice for this in past few weeks.  Outpatient L-spine imaging reportedly negative. Failed NSAIDS and SMR; had good response to steroid over past week but reinjured at work. Will avoid opioids, refill short course of methylprednisolone and trial Lidoderm patch.  [facility]    Amount and/or Complexity of Data Reviewed  Independent Historian: friend  External Data Reviewed: notes.    Risk  Prescription drug management.             Medications   predniSONE tablet 60 mg (60 mg Oral Given 6/16/25 2025)       ED Risk Strat Scores                    No data recorded                            History of Present Illness       Chief Complaint   Patient presents with    Back Pain     Pt reports back injury x2 weeks ago. Pt had XR done at that time and given relaxer's and steroids. Pt reports re injury to back about an hour ago. Pt reports at gym today leaning to p/u dumbells. Pt reports legs went numb at that time.       Past Medical History[1]   Past Surgical History[2]   Family History[3]   Social History[4]   E-Cigarette/Vaping    E-Cigarette Use Never User       E-Cigarette/Vaping Substances    Nicotine No     THC No     CBD No     Flavoring No     Other No     Unknown No       I have reviewed and agree with the history as documented.     Healthy  25-year-old male injured his low back lifting a log and twisting to put it into the bed of a truck 2 weeks ago.  Pain was across the low back bilaterally with radiation to both buttocks and posterior thighs.  Leg pain felt more like a burning and numbness.  No relief with ibuprofen and cyclobenzaprine.  Saw his PCP twice.  Did get relief with Medrol Dosepak.  Was feeling better so went back to work today.  He bent over and pain returned.  Denies recent fever, rash, abdominal pain, change in bowel or bladder control, saddle anesthesia, focal weakness, unexplained weight loss, recent instrumentation and history of cancer.        Review of Systems   Constitutional:  Negative for fever and unexpected weight change.   Respiratory:  Negative for cough and shortness of breath.    Cardiovascular:  Negative for chest pain.   Gastrointestinal:  Negative for abdominal pain and blood in stool.   Genitourinary:  Negative for difficulty urinating and dysuria.   Skin:  Negative for rash and wound.   Neurological:  Negative for weakness, numbness and headaches.           Objective       ED Triage Vitals [06/16/25 1743]   Temperature Pulse Blood Pressure Respirations SpO2 Patient Position - Orthostatic VS   98.3 °F (36.8 °C) 95 134/84 18 98 % Sitting      Temp Source Heart Rate Source BP Location FiO2 (%) Pain Score    Temporal Monitor Left arm -- 10 - Worst Possible Pain      Vitals      Date and Time Temp Pulse SpO2 Resp BP Pain Score FACES Pain Rating User   06/16/25 2027 -- 95 100 % 18 143/99 -- --    06/16/25 1743 98.3 °F (36.8 °C) 95 98 % 18 134/84 10 - Worst Possible Pain -- CM            Physical Exam  Vitals and nursing note reviewed.   Constitutional:       General: He is in acute distress (with movements).      Appearance: He is well-developed and normal weight. He is not ill-appearing or diaphoretic.   HENT:      Head: Normocephalic and atraumatic.      Right Ear: External ear normal.      Left Ear: External ear  normal.     Eyes:      General: No scleral icterus.     Conjunctiva/sclera: Conjunctivae normal.     Neck:      Vascular: No JVD.     Cardiovascular:      Rate and Rhythm: Normal rate and regular rhythm.      Pulses: Normal pulses.   Pulmonary:      Effort: Pulmonary effort is normal. No respiratory distress.   Abdominal:      Palpations: Abdomen is soft.      Tenderness: There is no abdominal tenderness.     Musculoskeletal:         General: No swelling, tenderness, deformity or signs of injury. Normal range of motion.      Cervical back: Neck supple. No tenderness.     Skin:     General: Skin is warm and dry.      Capillary Refill: Capillary refill takes less than 2 seconds.      Findings: No bruising, lesion or rash.     Neurological:      General: No focal deficit present.      Mental Status: He is alert and oriented to person, place, and time. Mental status is at baseline.      Cranial Nerves: No cranial nerve deficit.      Sensory: No sensory deficit.      Motor: No weakness.      Coordination: Coordination normal.      Gait: Gait abnormal.      Deep Tendon Reflexes: Reflexes are normal and symmetric. Reflexes normal.      Comments: Positive SLR on left   Psychiatric:         Mood and Affect: Mood normal.         Behavior: Behavior normal.         Results Reviewed       None            No orders to display       Procedures    ED Medication and Procedure Management   Prior to Admission Medications   Prescriptions Last Dose Informant Patient Reported? Taking?   ISOtretinoin (ACCUTANE) 30 MG capsule   Yes No   Sig: Take 30 mg by mouth 2 (two) times a day   albuterol (ProAir HFA) 90 mcg/act inhaler   No No   Sig: Inhale 2 puffs every 6 (six) hours as needed for wheezing or shortness of breath   colchicine (COLCRYS) 0.6 mg tablet  Mother No No   Sig: Take 1 tablet (0.6 mg total) by mouth daily for 27 days   Patient not taking: Reported on 2020   fluticasone (FLONASE) 50 mcg/act nasal spray   No No   Si  spray into each nostril daily   ibuprofen (MOTRIN) 400 mg tablet  Self No No   Sig: Take 1 tablet (400 mg total) by mouth every 8 (eight) hours for 35 doses   loratadine (CLARITIN) 10 mg tablet  Self Yes No   Sig: Take 10 mg by mouth daily   Patient not taking: Reported on 10/1/2023   ondansetron (ZOFRAN) 4 mg tablet  Self No No   Sig: Take 1 tablet (4 mg total) by mouth every 8 (eight) hours as needed for nausea or vomiting   Patient not taking: Reported on 10/6/2020   ondansetron (Zofran ODT) 4 mg disintegrating tablet   No No   Sig: Take 1 tablet (4 mg total) by mouth every 6 (six) hours as needed for nausea or vomiting   Patient not taking: Reported on 10/1/2023   pseudoephedrine (SUDAFED) 120 MG 12 hr tablet   No No   Sig: Take 1 tablet (120 mg total) by mouth 2 (two) times a day for 7 days   traZODone (DESYREL) 50 mg tablet   Yes No   Sig: Take 50 mg by mouth daily at bedtime as needed for sleep      Facility-Administered Medications: None     Discharge Medication List as of 6/16/2025  8:29 PM        START taking these medications    Details   methylPREDNISolone 4 MG tablet therapy pack Use as directed on package, Normal           CONTINUE these medications which have NOT CHANGED    Details   albuterol (ProAir HFA) 90 mcg/act inhaler Inhale 2 puffs every 6 (six) hours as needed for wheezing or shortness of breath, Starting Mon 7/22/2024, Normal      colchicine (COLCRYS) 0.6 mg tablet Take 1 tablet (0.6 mg total) by mouth daily for 27 days, Starting Sat 10/3/2020, Until Fri 10/30/2020, Normal      fluticasone (FLONASE) 50 mcg/act nasal spray 1 spray into each nostril daily, Starting Mon 7/22/2024, Normal      ibuprofen (MOTRIN) 400 mg tablet Take 1 tablet (400 mg total) by mouth every 8 (eight) hours for 35 doses, Starting Fri 10/2/2020, Until Mon 11/23/2020, Normal      ISOtretinoin (ACCUTANE) 30 MG capsule Take 30 mg by mouth 2 (two) times a day, Starting Wed 7/17/2024, Historical Med      loratadine  (CLARITIN) 10 mg tablet Take 10 mg by mouth daily, Historical Med      ondansetron (Zofran ODT) 4 mg disintegrating tablet Take 1 tablet (4 mg total) by mouth every 6 (six) hours as needed for nausea or vomiting, Starting Mon 10/10/2022, Normal      ondansetron (ZOFRAN) 4 mg tablet Take 1 tablet (4 mg total) by mouth every 8 (eight) hours as needed for nausea or vomiting, Starting Mon 7/6/2020, Normal      pseudoephedrine (SUDAFED) 120 MG 12 hr tablet Take 1 tablet (120 mg total) by mouth 2 (two) times a day for 7 days, Starting Mon 7/22/2024, Until Mon 7/29/2024, Normal      traZODone (DESYREL) 50 mg tablet Take 50 mg by mouth daily at bedtime as needed for sleep, Starting Thu 7/11/2024, Historical Med             ED SEPSIS DOCUMENTATION   Time reflects when diagnosis was documented in both MDM as applicable and the Disposition within this note       Time User Action Codes Description Comment    6/16/2025  8:21 PM Jorge Martínez Add [M54.42,  M54.41] Acute midline low back pain with bilateral sciatica                      [1]   Past Medical History:  Diagnosis Date    Asthma     Autoimmune disorder (HCC)     Mononucleosis     Myocarditis (HCC)     Splenomegaly     Vascular abnormality    [2]   Past Surgical History:  Procedure Laterality Date    EGD AND COLONOSCOPY  10/25/2018    WISDOM TOOTH EXTRACTION     [3]   Family History  Adopted: Yes   Problem Relation Name Age of Onset    Colon polyps Neg Hx      Colon cancer Neg Hx     [4]   Social History  Tobacco Use    Smoking status: Never    Smokeless tobacco: Never   Vaping Use    Vaping status: Never Used   Substance Use Topics    Alcohol use: No    Drug use: No        Jorge Martínez DO  06/17/25 1222

## 2025-06-17 ENCOUNTER — NURSE TRIAGE (OUTPATIENT)
Dept: PHYSICAL THERAPY | Facility: OTHER | Age: 25
End: 2025-06-17

## 2025-06-17 ENCOUNTER — TELEPHONE (OUTPATIENT)
Age: 25
End: 2025-06-17

## 2025-06-17 DIAGNOSIS — M54.42 ACUTE BILATERAL LOW BACK PAIN WITH LEFT-SIDED SCIATICA: Primary | ICD-10-CM

## 2025-06-17 NOTE — TELEPHONE ENCOUNTER
Caller: Garret     Doctor/Office: Not spa     Call regarding :  [facility]      Call was transferred to: Warm transfer to [facility]

## 2025-06-17 NOTE — DISCHARGE INSTRUCTIONS
In addition to muscle strain you may have a bulging disc or pinched nerve.  Please contact the [facility] tomorrow and tell them we want you to be seen as soon as they can for this.  Also contact your PCP and let your doctor know how you are.  Ask if they will prescribe MRI for your low back pain.    Take the steroid as ordered.  Please let your PCP and the [facility] clinicians know that you are taking a refill of this.    You can keep the lidocaine patch on for up to 12 hours.  If it helps buy more and use as directed or try other over-the-counter analgesic creams or patches.  Also take Tylenol up to a maximum of 4000 mg/day for your pain.    Return if you develop incontinence, inability to pass urine or stool, fever, worsening weakness or other significant changes.

## 2025-06-17 NOTE — TELEPHONE ENCOUNTER
Background - Initial Assessment  Clinical complaint: Pain is bilat low back with radiation down left leg to the thigh. No numbness or tingling. NKI Pt stated it started on 6/1/25. He was at home moving logs onto a bed of a truck. He twisted wrong while doing this and felt a pop. He had immediate pain and his legs gave out. Pt was seen by his PCP (DAVID) 6/3, and 6/10. He started to feel better and returned to work on 6/16/25. He came home and sat down after work, bent over to take off his shoes and felt the pop again, with the same instant pain. Pt seen in ED after on same day, 6/16/25. No prior back pain. No prior back SX. Pain is constant and feels sharp, shooting and throbbing. Is unable to get comfortable. Sitting and laying are worse. Laying not much better. Pain is interfering with his sleep.   Date of onset: 6/1/25  Frequency of pain: constant  Quality of pain: sharp, shooting, and throbbing    Protocols used: [facility] Protocol

## 2025-06-17 NOTE — TELEPHONE ENCOUNTER
Red Flag and Start Back charting is currently located under Additional Charting.    [facility] was reviewed in detail and what we can provide for their back pain.  Patient is agreeable to being triaged and would like to proceed with Physical Therapy.    Referral was placed for Physical Therapy at the [place] site. Patients information was sent to the  to make evaluation appointment. Patient made aware that the PT office  will be calling to schedule the appointment.  Patient was provided with the phone number to the PT office.    No further questions and/or concerns were voiced by the patient at this time. Patient states understanding of the referral that was placed.    Referral Closed.